# Patient Record
Sex: FEMALE | Race: WHITE | NOT HISPANIC OR LATINO | URBAN - METROPOLITAN AREA
[De-identification: names, ages, dates, MRNs, and addresses within clinical notes are randomized per-mention and may not be internally consistent; named-entity substitution may affect disease eponyms.]

---

## 2024-03-05 ENCOUNTER — OFFICE VISIT (OUTPATIENT)
Age: 34
End: 2024-03-05
Payer: COMMERCIAL

## 2024-03-05 VITALS — SYSTOLIC BLOOD PRESSURE: 114 MMHG | DIASTOLIC BLOOD PRESSURE: 62 MMHG

## 2024-03-05 PROCEDURE — 99205 OFFICE O/P NEW HI 60 MIN: CPT | Performed by: STUDENT IN AN ORGANIZED HEALTH CARE EDUCATION/TRAINING PROGRAM

## 2024-03-05 RX ORDER — ALBUTEROL SULFATE 90 UG/1
2 AEROSOL, METERED RESPIRATORY (INHALATION) EVERY 6 HOURS PRN
COMMUNITY

## 2024-03-05 NOTE — PROGRESS NOTES
INITIAL BREAST FEEDING EVALUATION    Informant/Relationship: mother and father     Discussion of General Lactation Issues: for the past month they have been having trouble with feeding, mom and myles were both sick at the end January, mom said she wasn't feeding well at that time and since then hasn't improved, screaming and crying every time mom tries to put her to the breast, pcp thought it might be some reflux she was put on famotidine two weeks ago, had a few good days, still with some spit up  She did have some signs of milk protein intolerance the end of December, mom cut out soy and dairy, back on soy now but still avoiding dairy   Dad will try to give her a bottle but she doesn't seem to like that very much either, they are having trouble finding a bottle that she likes   Family also mentions that sometimes after fussing and she is offered the breast or bottle then she will take it fine   Tried Dr. Carpio's regular nipple and wide neck, medela, spectra nipple  Jayleen had pain with breast feeding the first few days after birth but then got better  She is gaining weight well  They think she might also be teething, putting her hand in her mouth and drooling a lot     Infant is 3 months old today.      History:  Fertility Problem:no  Breast changes: yes  :  yes  Full term: yes   labor:no  First nursing/attempt < 1 hour after birth: yes  Skin to skin following delivery: yes  Breast changes after delivery:yes - milk came in a few days   Rooming in (infant in room with mother with exception of procedures, eg. Circumcision: no  Blood sugar issues:no  NICU stay:no  Jaundice:yes -    Phototherapy: yes for 1 day  Supplement given: (list supplement and method used as well as reason(s):yes - formula    History reviewed. No pertinent past medical history.      Current Outpatient Medications:   •  albuterol (PROVENTIL HFA,VENTOLIN HFA) 90 mcg/act inhaler, Inhale 2 puffs every 6 (six) hours as needed, Disp:  , Rfl:   •  Prenatal Multivit-Min-Fe-FA (Pre-Joelle Formula) TABS, Pre-Joelle Formula, Disp: , Rfl:     Allergies   Allergen Reactions   • Neomycin-Polymyxin-Hc Hives and Other (See Comments)     Pt reports that she can only take the brand name of Cortisporin if she takes the generic she gets numbness feeling on ear lobe ,burning sensation and feeling of ears are extremely clogged with decreased hearing     Pt reports that she can only take the brand name of Cortisporin if she takes the generic she gets numbness feeling on ear lobe ,burning sensation and feeling of ears are extremely clogged with decreased hearing    Pt reports that she can only take the brand name of Cortisporin if she takes the generic she gets numbness feeling on ear lobe ,burning sensation and feeling of ears are extremely clogged with decreased hearing     Pt reports that she can only take the brand name of Cortisporin if she takes the generic she gets numbness feeling on ear lobe ,burning sensation and feeling of ears are extremely clogged with decreased hearing   • Cephalexin Other (See Comments) and Rash   • Ciprofloxacin Rash and Other (See Comments)       Social History     Substance and Sexual Activity   Drug Use Not on file       Social History     Interval Breastfeeding History:    Frequency of breast feedin-3 hours but could go 5-6 hours with fussiness  Does mother feel breastfeeding is effective: sometimes   Does infant appear satisfied after nursing:Yes  Stooling pattern normal: Yes  Urinating frequently:Yes  Using shield or shells: No    Alternative/Artificial Feedings:   Bottle: Yes, offered maybe once a day by dad   Cup: No  Syringe/Finger: No           Formula Type: alimentum                      Amount: 2-4 oz             Breast Milk:                      Amount: 2-4 oz             Frequency Q 2-3 Hr between feedings  Elimination Problems: No    Offering one bottle a day    Equipment:  Nipple Shield             Type: n/a              Size: n/a             Frequency of Use: n/a  Pump            Type: spectra             Frequency of Use: once a day, 2-4 oz depending on time of day  Shells            Type: n/a            Frequency of use: n/a    Equipment Problems: no    Mom:  Breast: right breast larger than left, large, pendulous   Nipple Assessment in General: Normal: elongated/eraser, no discoloration and no damage noted.  Mother's Awareness of Feeding Cues                 Recognizes: Yes                  Verbalizes: Yes  Support System: FOB   History of Breastfeeding: none  Changes/Stressors/Violence: myles isn't feeding as well as she used to for the past few weeks, cries at the breast when mom offers it to her   Concerns/Goals: concern for strong milk flow from mom, why myles is fussy at the breast and bottle     Physical Exam  Constitutional:       Appearance: Normal appearance.   HENT:      Head: Normocephalic and atraumatic.   Eyes:      Extraocular Movements: Extraocular movements intact.   Cardiovascular:      Rate and Rhythm: Normal rate and regular rhythm.      Heart sounds: Normal heart sounds.   Musculoskeletal:      Cervical back: Normal range of motion and neck supple.   Neurological:      General: No focal deficit present.   Psychiatric:         Attention and Perception: Attention normal.         Mood and Affect: Mood normal.         Behavior: Behavior normal. Behavior is cooperative.         Infant:  Behaviors: Alert  Color: healthy   Birth weight: 3671 g   Current weight: 6230 g     Problems with infant: fussy at breast       General Appearance:  Alert, active, fussy and crying when trying to examine, able to be consoled by mom and dad                             Head:  Normocephalic, AFOF, sutures opposed                            Eyes:   Conjunctiva clear, no drainage                            Ears:   Normally placed, no anomolies                           Nose:   Septum intact, no drainage or erythema                           Mouth:  No lesions, unable to do a very thorough exam as myles was not able to calm down during examination, tongue did stay flat with crying, frenulum appeared elastic                    Neck:  Supple, symmetrical, trachea midline, no adenopathy; thyroid: no enlargement, symmetric, no tenderness/mass/nodules                Respiratory:  No grunting, flaring, retractions, breath sounds clear and equal           Cardiovascular:  Regular rate and rhythm. No murmur. Adequate perfusion/capillary refill. Femoral pulse present                  Abdomen:    Soft, non-tender, no masses, bowel sounds present, no HSM       Musculoskeletal:   Full range of motion         Skin/Hair/Nails:   Skin warm, dry, and intact, no rashes or abnormal dyspigmentation or lesions               Neurologic:   No abnormal movement, tone appropriate for gestational age    Shipman Latch: very brief   Efficiency:               Lips Flanged: Yes              Depth of latch: deep               Audible Swallow: Yes              Visible Milk: Yes              Wide Open/ Asymmetrical: Yes              Suck Swallow Cycle: Breathing: non labored, Coordinated: yes   Nipple Assessment after latch: Normal: elongated/eraser, no discoloration and no damage noted.  Latch Problems: did take a few attempts to get her to latch, myles was very distracted and fussy when offered the breast, she did finally latch for a few minutes but then got distracted and start crying again, when she was finally consoled she was happy and content in mom's lap     Position:  Infant's Ergonomics/Body               Body Alignment: Yes               Head Supported: Yes               Close to Mom's body/ Lifted/ Supported: Yes               Mom's Ergonomics/Body: Yes                           Supported: Yes                           Sitting Back: Yes                           Brings Baby to her breast: Yes  Positioning Problems: none     Education:  Reviewed Infant:Cues  and varied States of Awareness  Reviewed Alternative/Artificial Feedings: formula and bottle feeding   Reviewed Mom/Breast care: pumping       Plan:    I discussed with Jayleen that I don't believe there is anything wrong with the way she is nursing myles. I did recommend trying to space out feeding sessions a little more to 3-4 hours. There may be times Myles is fussy but not hungry so if she is offered the breast and doesn't want to take it then don't try to keep offering as this can cause frustration for both mom and baby. Myles could be fussy for a variety of reasons including reflux and teething.   Try offering the bottle one or two more times throughout the day so she can continue getting used to it.   Pump when Myles is offered the bottle.   Can increase the dose of the famotidine to 0.85 mL based on her weight in the office today until she has her 4 month WCC with her PCP.   I was not really able to evaluate for tongue tie but regardless I told family that if she had been feeding well at the breast up until this time and gaining weight a tongue tie wouldn't be causing this issue she is having now and we wouldn't intervene anyway.     I have spent 90 minutes with Patient and family today in which greater than 50% of this time was spent in counseling/coordination of care regarding Instructions for management, Impressions, Counseling / Coordination of care, Documenting in the medical record, and Obtaining or reviewing history  .

## 2024-07-25 ENCOUNTER — TELEPHONE (OUTPATIENT)
Dept: POSTPARTUM | Facility: CLINIC | Age: 34
End: 2024-07-25

## 2024-07-25 NOTE — TELEPHONE ENCOUNTER
About a month ago, Jayleen noticed a drop in the volumes of milk she is able to express when pumping.  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  Since Jayleen is home for the summer, she is trying to nurse more frequently and pump less but Ann is reluctant to nurse during the day.  She developed a bottle preference initially when Jayleen returned to work in March.  When Jayleen was pumping during the school year, she was expressing an average of 4 ounces every 3 hours or so and up to 6 ounces when her breasts were deleon.   In the beginning of June, Ann began nursing at night again for the first time in a while.  Jayleen attempted to decrease the amount of times she was feeding at night but was still nursing a couple of times at night.  She is content and happy to nurse at night without any signs of frustration.   Jayleen continues to pump about 5 times during the day as she has been. The volumes of milk she is expresses varies from 1-3 ounces.    Jayleen and Ann have had a few mild viral illnesses in the last few months but no serious illnesses. Jayleen is not currently taking any medications that have the potential to impact milk production.  She is not currently pregnant.   Jayleen is using a Spectra S9 pump, Mom Cozy and a new manual pump.  She primarily uses her Spectra pump. She replaced her parts in the beginning of June.   I encouraged Jayleen to trouble shoot her pump to assure it's in good working order.  I reassured her that she is pumping frequently enough.  We discussed potential reasons for drop in milk production and I offered her an appointment with Dr Barrientos for more evaluation of causes and treatments for low milk production.  An appointment was scheduled at her request.

## 2024-07-25 NOTE — TELEPHONE ENCOUNTER
Spoke with Jayleen - said since mid June she has noticed a slight drop in supply, but within the last 2 wks it has dropped much more. She breastfeeds as well pumps for Ann (8m).  She would like to discuss how to build back up her supply.   She was previously seen by Dr. Blackman in March.

## 2024-07-31 ENCOUNTER — OFFICE VISIT (OUTPATIENT)
Dept: POSTPARTUM | Facility: CLINIC | Age: 34
End: 2024-07-31
Payer: COMMERCIAL

## 2024-07-31 VITALS — DIASTOLIC BLOOD PRESSURE: 78 MMHG | SYSTOLIC BLOOD PRESSURE: 118 MMHG

## 2024-07-31 PROCEDURE — 99215 OFFICE O/P EST HI 40 MIN: CPT | Performed by: PEDIATRICS

## 2024-07-31 NOTE — PATIENT INSTRUCTIONS
Continue to express breast milk as often as you feel is helpful to maintain your production and certainly for comfort. Typically anytime Ann gets a bottle is a good time.    Start the pump on the massage mode until milk flow starts, then cycle to expression. When flow slows, go back to massage. Finish on expression when flow slows again. Pumping should not take more than 15-20 minutes. It is recommended to use the lowest effective suction. Lower suctions usually work best with appropriate flange sizing. It is recommended that your nipple move freely in the tunnel of the flange with little to no areola joining it. Try a 17-19 mm flange (or insert) for your right nipple and a 19-21 mm flange (or insert) for the left.

## 2024-07-31 NOTE — PROGRESS NOTES
BREAST FEEDING FOLLOW UP VISIT    Informant/Relationship: Jayleen/mom    Discussion of General Lactation Issues: Jayleen has noted that she has had a decrease in her milk production over the past 6 weeks. 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    Jayleen has noted several differences. In May, Ann began refusing to use the breastfeeding pillow and so Jayleen was relying more on the pump even when home. Shortly after this, there was an incident that changed her feeding schedule when all bottles and milk were affected by a boiling incident and she changed her feeding schedule. Ann is currently waking more at night to nurse. This has improved somewhat but had been worse especially right after she didn't eat all day.    During the day Ann eats baby food and cereal twice. She may breast feed to sleep but she doesn't otherwise show interest in the breast. Jayleen is, therefore, pumping milk throughout the day. She is currently pumping 3-4 times throughout the day. Ann will breastfeed at bed time, once or twice over night, and first thing in the morning.    Jayleen was expressing as much 4 oz every time she pumped (a little more with her first pump). Now she is pumping about 4 oz in the morning and 2-3 oz for the other sessions. Jayleen is finding that she has more success with her hand pump than the electric pump on occasion. She is currently pumping 3-4 times/day. She pumps for about 20 minutes.     Infant is 8 month 2 weeks old today.    Interval Breastfeeding History:    Frequency of breast feedin-5 times per day, usually over night  Does mother feel breastfeeding is effective: Yes; when she latches  Does infant appear satisfied after nursing:If no, explain: not always willing to attach  Stooling pattern normal:Yes  Urinating frequently:Yes  Using shield or shells:No    Alternative/Artificial Feedings:   Bottle: Yes, trying paced bottle feeding, using stage 3 nipples  Cup: No  Syringe/Finger: No           Formula Type: offered,  "won't take                     Amount: n/a            Breast Milk:                      Amount: 3-4 oz            Frequency Q 3 Hr between feedings  Elimination Problems: No      Equipment:  Nipple Shield             Type: n/a             Size: n/a             Frequency of Use: n/a  Pump            Type: Spectra S9, she just started cycling through the settings; she is using a 24 flange; she uses a 7 suction            Frequency of Use: 2-3 times/day; currently expressing up to 4 oz/session  Shells            Type: n/a            Frequency of use: n/a    Equipment Problems: no      Mom:  Breast: Normal  Nipple Assessment in General: Normal: elongated/eraser, no discoloration and no damage noted.  Mother's Awareness of Feeding Cues                 Recognizes: No                  Verbalizes: No, states she \"forces the bottle on her every few hours\"  Support System: FOB  History of Breastfeeding: none  Changes/Stressors/Violence: concerns about milk production  Concerns/Goals: Jayleen is finding breastfeeding overall very frustrating at this time, but wishes to feed Ann breast milk until a year     Problems with Mom: concerns about milk production    Physical Exam  Constitutional:       Appearance: Normal appearance. She is well-developed and normal weight.   HENT:      Head: Normocephalic and atraumatic.   Eyes:      Extraocular Movements: Extraocular movements intact.   Neck:      Thyroid: No thyromegaly.   Cardiovascular:      Rate and Rhythm: Normal rate and regular rhythm.      Pulses: Normal pulses.      Heart sounds: Normal heart sounds. No murmur heard.  Pulmonary:      Effort: Pulmonary effort is normal.      Breath sounds: Normal breath sounds.   Musculoskeletal:      Cervical back: Normal range of motion and neck supple.   Lymphadenopathy:      Cervical: No cervical adenopathy.      Upper Body:      Right upper body: No pectoral adenopathy.      Left upper body: No pectoral adenopathy.   Neurological:      " General: No focal deficit present.      Mental Status: She is alert and oriented to person, place, and time.   Psychiatric:         Mood and Affect: Mood normal.         Behavior: Behavior normal.         Thought Content: Thought content normal.         Judgment: Judgment normal.   Vitals and nursing note reviewed.         Infant:  Behaviors: Alert  Color: Healthy  Birth weight: 3.629 kg  Current weight: 8.075 kg    Problems with infant: None      General Appearance:  Alert, active, no distress                            Head:  Normocephalic, AFOF, sutures opposed                            Eyes:   Conjunctiva clear, no drainage                            Ears:   Normally placed, no anomolies                           Nose:   Septum intact, no drainage or erythema                          Mouth:  No lesions                   Neck:  Supple, symmetrical, trachea midline, no adenopathy; thyroid: no enlargement, symmetric, no tenderness/mass/nodules                Respiratory:  No grunting, flaring, retractions, breath sounds clear and equal           Cardiovascular:  Regular rate and rhythm. No murmur. Adequate perfusion/capillary refill. Femoral pulse present                  Abdomen:    Soft, non-tender, no masses, bowel sounds present, no HSM            Genitourinary:  Normal female genitalia, anus patent                         Spine:   No abnormalities noted       Musculoskeletal:   Full range of motion         Skin/Hair/Nails:   Skin warm, dry, and intact, no rashes or abnormal dyspigmentation or lesions               Neurologic:   No abnormal movement, tone appropriate for gestational age     Latch:  Ann shows no interest in either the breast or bottle          Position:  Jayleen appears very comfortable positioning Ann, though the baby shows no interest in attaching.        Education:  Reviewed Mom/Breast care: Reviewed frequency of milk expression to continue to maintain milk production to meet  baby's needs  Reviewed Equipment: Reviewed how to best size flanges and use the settings on the pump to maximize comfort and effectiveness of the pump      Plan:  Discussed history and physical exam with Christiane. Reassurance given that Ann is gaining weight well. Reassurance given that she is expressing enough milk. Support given regarding Christiane's commitment ot provide her milk for christiane and that she is making enough milk. Discussed how to improve flange fit and best use the settings on the pump for more effective and more comfortable expression. Christiane may choose to express breast milk whenever Ann is offered or takes a bottle. Additional support remains available.     I have spent 50 minutes with Patient  today in which greater than 50% of this time was spent in counseling/coordination of care regarding Prognosis, Risks and benefits of tx options, Instructions for management, Patient and family education, Importance of tx compliance, Risk factor reductions, Impressions, Counseling / Coordination of care, Documenting in the medical record, Reviewing / ordering tests, medicine, procedures  , and Obtaining or reviewing history  .